# Patient Record
(demographics unavailable — no encounter records)

---

## 2025-01-08 NOTE — HISTORY OF PRESENT ILLNESS
[FreeTextEntry1] : Jv Felipe presents for well woman visit with gyn exam.  some right sided pain somewhat cyclical nl appetite